# Patient Record
Sex: FEMALE | Race: WHITE | URBAN - METROPOLITAN AREA
[De-identification: names, ages, dates, MRNs, and addresses within clinical notes are randomized per-mention and may not be internally consistent; named-entity substitution may affect disease eponyms.]

---

## 2018-12-16 ENCOUNTER — ED HISTORICAL/CONVERTED ENCOUNTER (OUTPATIENT)
Dept: OTHER | Age: 58
End: 2018-12-16

## 2022-04-14 ENCOUNTER — HOSPITAL ENCOUNTER (EMERGENCY)
Age: 62
Discharge: HOME | End: 2022-04-14
Attending: STUDENT IN AN ORGANIZED HEALTH CARE EDUCATION/TRAINING PROGRAM
Payer: COMMERCIAL

## 2022-04-14 VITALS
TEMPERATURE: 98.4 F | DIASTOLIC BLOOD PRESSURE: 84 MMHG | HEIGHT: 61 IN | RESPIRATION RATE: 18 BRPM | WEIGHT: 127 LBS | SYSTOLIC BLOOD PRESSURE: 147 MMHG | BODY MASS INDEX: 23.98 KG/M2 | HEART RATE: 75 BPM | OXYGEN SATURATION: 98 %

## 2022-04-14 DIAGNOSIS — J01.11 ACUTE RECURRENT FRONTAL SINUSITIS: Primary | ICD-10-CM

## 2022-04-14 PROCEDURE — 99283 EMERGENCY DEPT VISIT LOW MDM: CPT

## 2022-04-14 RX ORDER — VALACYCLOVIR HYDROCHLORIDE 500 MG/1
500 TABLET, FILM COATED ORAL DAILY
COMMUNITY
Start: 2022-03-03

## 2022-04-14 RX ORDER — CETIRIZINE HCL 10 MG
10 TABLET ORAL DAILY
Qty: 7 TABLET | Refills: 0 | Status: SHIPPED | OUTPATIENT
Start: 2022-04-14 | End: 2022-04-21

## 2022-04-14 RX ORDER — VALSARTAN AND HYDROCHLOROTHIAZIDE 80; 12.5 MG/1; MG/1
1 TABLET, FILM COATED ORAL DAILY
COMMUNITY
Start: 2022-03-22

## 2022-04-14 RX ORDER — OXYMETAZOLINE HCL 0.05 %
2 SPRAY, NON-AEROSOL (ML) NASAL 2 TIMES DAILY
Qty: 1 EACH | Refills: 0 | Status: SHIPPED | OUTPATIENT
Start: 2022-04-14 | End: 2022-04-17

## 2022-04-14 RX ORDER — ACETAMINOPHEN 325 MG/1
650 TABLET ORAL
Qty: 20 TABLET | Refills: 0 | Status: SHIPPED | OUTPATIENT
Start: 2022-04-14

## 2022-04-14 RX ORDER — LEVOFLOXACIN 750 MG/1
750 TABLET ORAL DAILY
Qty: 10 TABLET | Refills: 0 | Status: SHIPPED | OUTPATIENT
Start: 2022-04-14 | End: 2022-04-24

## 2022-04-14 RX ORDER — IBUPROFEN 600 MG/1
600 TABLET ORAL
Qty: 20 TABLET | Refills: 0 | Status: SHIPPED | OUTPATIENT
Start: 2022-04-14

## 2022-04-14 NOTE — ED PROVIDER NOTES
[id number]  EMERGENCY DEPARTMENT ENCOUNTER NOTE    Date: 4/14/2022  Patient Name: Ladarius Mendez    History of Presenting Illness     Chief Complaint   Patient presents with    Sinus Infection     HPI: Ladarius Mendez, 62 y.o. female with a past medical history and home medications as listed below presents for URI symptoms. The patient reports a few day history of the following symptoms: Congestion and Sore Throat as well as ear ache and facial pain that is consistent with her recurrent sinusitis that occurs on a yearly basis. The patient does not report any Fever, Chills, Cough, Nausea, Vomiting, Chest Pain and Shortness of breath. Medical History   I reviewed the medical, surgical, family, and social history, as well as allergies:    PCP: None    Past Medical History:  Past Medical History:   Diagnosis Date    Hypertension      Past Surgical History:  History reviewed. No pertinent surgical history. Current Outpatient Medications:  Current Outpatient Medications   Medication Instructions    acetaminophen (TYLENOL) 650 mg, Oral, EVERY 6 HOURS AS NEEDED    cetirizine (ZYRTEC) 10 mg, Oral, DAILY    ibuprofen (MOTRIN) 600 mg, Oral, 3 TIMES DAILY AS NEEDED    levoFLOXacin (LEVAQUIN) 750 mg, Oral, DAILY    oxymetazoline (Afrin, oxymetazoline,) 0.05 % nasal spray 2 Sprays, Both Nostrils, 2 TIMES DAILY, DO NOT TAKE MORE THAN 3-4 DAYS TO PREVENT BAD RUNNY NOSE AS SIDE EFFECT.  valACYclovir (VALTREX) 500 mg, Oral, DAILY    valsartan-hydroCHLOROthiazide (DIOVAN-HCT) 80-12.5 mg per tablet 1 Tablet, Oral, DAILY      Family History:  History reviewed. No pertinent family history. Social History:  Social History     Tobacco Use    Smoking status: Never Smoker    Smokeless tobacco: Never Used   Substance Use Topics    Alcohol use: Yes     Comment: Occ    Drug use: Never     Allergies:   Allergies   Allergen Reactions    Penicillins Unknown (comments)     As a child Review of Systems     All other systems negative. Physical Exam and Vital Signs   Vital Signs - Reviewed the patient's vital signs. Patient Vitals for the past 12 hrs:   Temp Pulse Resp BP SpO2   04/14/22 0802 98.4 °F (36.9 °C) 75 18 (!) 147/84 98 %     Physical Exam:    GENERAL: awake, alert, cooperative, not in distress  HEENT:  * Pupils equal, EOMI  * Head atraumatic  CV:  * audible heart sounds  * warm and perfused extremities bilaterally  PULMONARY: Good air movement, no wheezes, no crackles  ABDOMEN/: soft, no distension, no guarding, no suprapubic tenderness, no abdominal tenderness. : No suprapubic tenderness  EXTREMITIES/BACK: warm and perfused, no tenderness, no edema  SKIN: no rashes or signs of trauma  NEURO:  * GCS 15  * Moves U&LE to command    Medical Decision Making   - I am the first and primary provider for this patient and am the primary provider of record. - I reviewed the vital signs, available nursing notes, past medical history, past surgical history, family history and social history. - Initial assessment performed. The patients presenting problems have been discussed, and the staff are in agreement with the care plan formulated and outlined with them. I have encouraged them to ask questions as they arise throughout their visit. - Available medical records, nursing notes, old EKGs, and EMS run sheets (if patient was EMS transported) were reviewed    MDM:   Patient is a 62 y.o. female presenting for URI symptoms. Vitals reveal no hypoxia, hypotension or tachypnea and physical exam reveals no significant abnormalities. Based on the history, physical exam, risk factors, and vitals signs, differential includes: URI, sinusitis, postnasal drip. Patient is well appearing, not hypoxic, and is not in respiratory distress. Due to normal exam and the absence of hypoxia, there is no concern for significant pneumonia.      Results     Labs:  No results found for this or any previous visit (from the past 12 hour(s)). Radiologic Studies:  CT Results  (Last 48 hours)    None        CXR Results  (Last 48 hours)    None        Medications ordered:  Medications - No data to display  ED Course and Reassessment     ED Course:          Reassessment:      As the patient appears generally well, non-toxic with a completely reassuring clinical picture and exam, and is able to tolerate PO intake, I feel the patient is a good candidate for an outpatient trial of antipyretics and close observation and quarantine with return precautions. Patient is well appearing, not hypoxic, and is not in respiratory distress. Due to normal exam and the absence of hypoxia, there is no concern for significant pneumonia. Workup in the ED not indicated. Will discharge with return precautions. Picture of sinusitis. Will give somatic treatment and levofloxacin. Final Disposition     DISPOSITION: DISCHARGED    Patient will be discharged from the Emergency Department in stable condition. All of the diagnostic tests were reviewed and any questions were answered. Diagnosis, results, follow up if applicable, and return precautions were discussed. I have also put together printed discharge instructions for them that include: 1) educational information regarding their diagnosis, 2) how to care for their diagnosis at home, as well a 3) list of reasons why they would want to return to the ED prior to their follow-up appointment, should their condition change. Any labs or imaging done in the ED will be either printed with the discharge paperwork or available through [address] DISCHARGE PLAN:  1. Current Discharge Medication List      START taking these medications    Details   acetaminophen (TYLENOL) 325 mg tablet Take 2 Tablets by mouth every six (6) hours as needed for Pain. Qty: 20 Tablet, Refills: 0      ibuprofen (MOTRIN) 600 mg tablet Take 1 Tablet by mouth three (3) times daily as needed for Pain.   Qty: 20 Tablet, Refills: 0      cetirizine (ZYRTEC) 10 mg tablet Take 1 Tablet by mouth daily for 7 days. Qty: 7 Tablet, Refills: 0      oxymetazoline (Afrin, oxymetazoline,) 0.05 % nasal spray 2 Sprays by Both Nostrils route two (2) times a day for 3 days. DO NOT TAKE MORE THAN 3-4 DAYS TO PREVENT BAD RUNNY NOSE AS SIDE EFFECT. Qty: 1 Each, Refills: 0      levoFLOXacin (LEVAQUIN) 750 mg tablet Take 1 Tablet by mouth daily for 10 days. Qty: 10 Tablet, Refills: 0         CONTINUE these medications which have NOT CHANGED    Details   valACYclovir (VALTREX) 500 mg tablet Take 500 mg by mouth daily. valsartan-hydroCHLOROthiazide (DIOVAN-HCT) 80-12.5 mg per tablet Take 1 Tablet by mouth daily. 2.   Follow-up Information     Follow up With Specialties Details Why Contact Info    [facility] Emergency Medicine Go to  If symptoms worsen [address]  [phone]    Your doctor  Schedule an appointment as soon as possible for a visit in 3 days          3. Return to ED if worse    4. Current Discharge Medication List      START taking these medications    Details   acetaminophen (TYLENOL) 325 mg tablet Take 2 Tablets by mouth every six (6) hours as needed for Pain. Qty: 20 Tablet, Refills: 0  Start date: 4/14/2022      ibuprofen (MOTRIN) 600 mg tablet Take 1 Tablet by mouth three (3) times daily as needed for Pain. Qty: 20 Tablet, Refills: 0  Start date: 4/14/2022      cetirizine (ZYRTEC) 10 mg tablet Take 1 Tablet by mouth daily for 7 days. Qty: 7 Tablet, Refills: 0  Start date: 4/14/2022, End date: 4/21/2022      oxymetazoline (Afrin, oxymetazoline,) 0.05 % nasal spray 2 Sprays by Both Nostrils route two (2) times a day for 3 days. DO NOT TAKE MORE THAN 3-4 DAYS TO PREVENT BAD RUNNY NOSE AS SIDE EFFECT.   Qty: 1 Each, Refills: 0  Start date: 4/14/2022, End date: 4/17/2022      levoFLOXacin (LEVAQUIN) 750 mg tablet Take 1 Tablet by mouth daily for 10 days.  Duanne Felty: 10 Tablet, Refills: 0  Start date: 4/14/2022, End date: 4/24/2022             Diagnosis     Clinical Impression:   1. Acute recurrent frontal sinusitis      Attestations:    Samantha Tapia MD    Please note that this dictation was completed with [employer], the computer voice recognition software. Quite often unanticipated grammatical, syntax, homophones, and other interpretive errors are inadvertently transcribed by the computer software. Please disregard these errors. Please excuse any errors that have escaped final proofreading. Thank you.

## 2022-12-24 ENCOUNTER — APPOINTMENT (OUTPATIENT)
Dept: GENERAL RADIOLOGY | Age: 62
End: 2022-12-24
Attending: STUDENT IN AN ORGANIZED HEALTH CARE EDUCATION/TRAINING PROGRAM
Payer: COMMERCIAL

## 2022-12-24 ENCOUNTER — HOSPITAL ENCOUNTER (EMERGENCY)
Age: 62
Discharge: HOME | End: 2022-12-24
Attending: STUDENT IN AN ORGANIZED HEALTH CARE EDUCATION/TRAINING PROGRAM
Payer: COMMERCIAL

## 2022-12-24 VITALS
OXYGEN SATURATION: 98 % | HEIGHT: 61 IN | WEIGHT: 127 LBS | DIASTOLIC BLOOD PRESSURE: 76 MMHG | HEART RATE: 83 BPM | SYSTOLIC BLOOD PRESSURE: 125 MMHG | RESPIRATION RATE: 18 BRPM | TEMPERATURE: 98.4 F | BODY MASS INDEX: 23.98 KG/M2

## 2022-12-24 DIAGNOSIS — J18.9 PNEUMONIA OF BOTH LUNGS DUE TO INFECTIOUS ORGANISM, UNSPECIFIED PART OF LUNG: Primary | ICD-10-CM

## 2022-12-24 LAB
ALBUMIN SERPL-MCNC: 3.6 G/DL (ref 3.5–5)
ALBUMIN/GLOB SERPL: 1 {RATIO} (ref 1.1–2.2)
ALP SERPL-CCNC: 53 U/L (ref 45–117)
ALT SERPL-CCNC: 36 U/L (ref 12–78)
ANION GAP SERPL CALC-SCNC: 10 MMOL/L (ref 5–15)
AST SERPL W P-5'-P-CCNC: 37 U/L (ref 15–37)
BASOPHILS # BLD: 0 K/UL (ref 0–0.1)
BASOPHILS NFR BLD: 0 % (ref 0–1)
BILIRUB SERPL-MCNC: 0.5 MG/DL (ref 0.2–1)
BUN SERPL-MCNC: 11 MG/DL (ref 6–20)
BUN/CREAT SERPL: 13 (ref 12–20)
CA-I BLD-MCNC: 9.1 MG/DL (ref 8.5–10.1)
CHLORIDE SERPL-SCNC: 101 MMOL/L (ref 97–108)
CO2 SERPL-SCNC: 27 MMOL/L (ref 21–32)
CREAT SERPL-MCNC: 0.82 MG/DL (ref 0.55–1.02)
DIFFERENTIAL METHOD BLD: ABNORMAL
EOSINOPHIL # BLD: 0 K/UL (ref 0–0.4)
EOSINOPHIL NFR BLD: 0 % (ref 0–7)
ERYTHROCYTE [DISTWIDTH] IN BLOOD BY AUTOMATED COUNT: 12.2 % (ref 11.5–14.5)
GLOBULIN SER CALC-MCNC: 3.6 G/DL (ref 2–4)
GLUCOSE SERPL-MCNC: 106 MG/DL (ref 65–100)
HCT VFR BLD AUTO: 39.2 % (ref 35–47)
HGB BLD-MCNC: 13.1 G/DL (ref 11.5–16)
IMM GRANULOCYTES # BLD AUTO: 0 K/UL
IMM GRANULOCYTES NFR BLD AUTO: 0 %
LYMPHOCYTES # BLD: 0.9 K/UL (ref 0.8–3.5)
LYMPHOCYTES NFR BLD: 8 % (ref 12–49)
MCH RBC QN AUTO: 33.1 PG (ref 26–34)
MCHC RBC AUTO-ENTMCNC: 33.4 G/DL (ref 30–36.5)
MCV RBC AUTO: 99 FL (ref 80–99)
MONOCYTES # BLD: 0.6 K/UL (ref 0–1)
MONOCYTES NFR BLD: 6 % (ref 5–13)
NEUTS BAND NFR BLD MANUAL: 30 % (ref 0–6)
NEUTS SEG # BLD: 9.3 K/UL (ref 1.8–8)
NEUTS SEG NFR BLD: 56 % (ref 32–75)
PLATELET # BLD AUTO: 151 K/UL (ref 150–400)
PMV BLD AUTO: 10.1 FL (ref 8.9–12.9)
POTASSIUM SERPL-SCNC: 3.5 MMOL/L (ref 3.5–5.1)
PROT SERPL-MCNC: 7.2 G/DL (ref 6.4–8.2)
RBC # BLD AUTO: 3.96 M/UL (ref 3.8–5.2)
RBC MORPH BLD: ABNORMAL
SODIUM SERPL-SCNC: 138 MMOL/L (ref 136–145)
WBC # BLD AUTO: 10.8 K/UL (ref 3.6–11)

## 2022-12-24 PROCEDURE — 74011250637 HC RX REV CODE- 250/637: Performed by: STUDENT IN AN ORGANIZED HEALTH CARE EDUCATION/TRAINING PROGRAM

## 2022-12-24 PROCEDURE — 74011250636 HC RX REV CODE- 250/636: Performed by: STUDENT IN AN ORGANIZED HEALTH CARE EDUCATION/TRAINING PROGRAM

## 2022-12-24 PROCEDURE — 36415 COLL VENOUS BLD VENIPUNCTURE: CPT

## 2022-12-24 PROCEDURE — 80053 COMPREHEN METABOLIC PANEL: CPT

## 2022-12-24 PROCEDURE — 99284 EMERGENCY DEPT VISIT MOD MDM: CPT

## 2022-12-24 PROCEDURE — 74011000250 HC RX REV CODE- 250: Performed by: STUDENT IN AN ORGANIZED HEALTH CARE EDUCATION/TRAINING PROGRAM

## 2022-12-24 PROCEDURE — 85025 COMPLETE CBC W/AUTO DIFF WBC: CPT

## 2022-12-24 PROCEDURE — 71046 X-RAY EXAM CHEST 2 VIEWS: CPT

## 2022-12-24 RX ORDER — LEVOFLOXACIN 750 MG/1
750 TABLET ORAL DAILY
Qty: 10 TABLET | Refills: 0 | Status: SHIPPED | OUTPATIENT
Start: 2022-12-24

## 2022-12-24 RX ORDER — LIDOCAINE HYDROCHLORIDE 20 MG/ML
10 SOLUTION OROPHARYNGEAL
Status: COMPLETED | OUTPATIENT
Start: 2022-12-24 | End: 2022-12-24

## 2022-12-24 RX ORDER — LIDOCAINE HYDROCHLORIDE 20 MG/ML
15 SOLUTION OROPHARYNGEAL AS NEEDED
Qty: 100 ML | Refills: 0 | Status: SHIPPED | OUTPATIENT
Start: 2022-12-24

## 2022-12-24 RX ORDER — DOXYCYCLINE 100 MG/1
100 CAPSULE ORAL
Status: DISCONTINUED | OUTPATIENT
Start: 2022-12-24 | End: 2022-12-24

## 2022-12-24 RX ORDER — LEVOFLOXACIN 250 MG/1
500 TABLET ORAL
Status: COMPLETED | OUTPATIENT
Start: 2022-12-24 | End: 2022-12-24

## 2022-12-24 RX ADMIN — Medication 10 ML: at 13:05

## 2022-12-24 RX ADMIN — LEVOFLOXACIN 500 MG: 250 TABLET, FILM COATED ORAL at 12:37

## 2022-12-24 RX ADMIN — SODIUM CHLORIDE 1000 ML: 9 INJECTION, SOLUTION INTRAVENOUS at 12:36

## 2022-12-24 NOTE — ED TRIAGE NOTES
Patient reports 3 days of fever, sore throat, home test for covid positive, bilateral ear pain, productive cough, and headache.

## 2022-12-24 NOTE — ED PROVIDER NOTES
EMERGENCY DEPARTMENT HISTORY AND PHYSICAL EXAM      Date: 12/24/2022  Patient Name: Oleksandr Irby    History of Presenting Illness     Chief Complaint   Patient presents with    Positive For Covid-19       History Provided By: Patient    HPI: Oleksandr Irby, 65 y.o. female with a past medical history significant hypertension presents to the ED with cc of cough, productive of thick sputum. Patient states she was recently diagnosed with COVID-19, is currently on antivirals. States that over the last 2 days has noticed worsening cough productive of greenish sputum, denies any chest pain denies any shortness of breath. There are no other complaints, changes, or physical findings at this time. PCP: Kris Carias MD    No current facility-administered medications on file prior to encounter. Current Outpatient Medications on File Prior to Encounter   Medication Sig Dispense Refill    valACYclovir (VALTREX) 500 mg tablet Take 500 mg by mouth daily. valsartan-hydroCHLOROthiazide (DIOVAN-HCT) 80-12.5 mg per tablet Take 1 Tablet by mouth daily. acetaminophen (TYLENOL) 325 mg tablet Take 2 Tablets by mouth every six (6) hours as needed for Pain. 20 Tablet 0    ibuprofen (MOTRIN) 600 mg tablet Take 1 Tablet by mouth three (3) times daily as needed for Pain. 20 Tablet 0       Past History     Past Medical History:  Past Medical History:   Diagnosis Date    Hypertension        Past Surgical History:  No past surgical history on file. Family History:  No family history on file. Social History:  Social History     Tobacco Use    Smoking status: Never    Smokeless tobacco: Never   Substance Use Topics    Alcohol use: Yes     Comment: Occ    Drug use: Never       Allergies: Allergies   Allergen Reactions    Penicillins Unknown (comments)     As a child         Review of Systems     Review of Systems   Constitutional:  Positive for chills. Negative for activity change, appetite change and fever.    HENT: Positive for sore throat. Negative for ear pain and rhinorrhea. Eyes:  Negative for photophobia and visual disturbance. Respiratory:  Positive for cough and shortness of breath. Cardiovascular:  Negative for chest pain and palpitations. Gastrointestinal:  Negative for abdominal pain and nausea. Genitourinary:  Negative for dysuria and hematuria. Musculoskeletal:  Negative for arthralgias and myalgias. Skin:  Negative for color change and pallor. Neurological:  Negative for dizziness, weakness, numbness and headaches. Physical Exam     Physical Exam  Vitals and nursing note reviewed. Constitutional:       General: She is not in acute distress. Appearance: Normal appearance. She is normal weight. She is not ill-appearing. HENT:      Head: Normocephalic and atraumatic. Nose: Nose normal.      Mouth/Throat:      Mouth: Mucous membranes are moist.   Eyes:      Extraocular Movements: Extraocular movements intact. Pupils: Pupils are equal, round, and reactive to light. Cardiovascular:      Rate and Rhythm: Normal rate and regular rhythm. Pulses: Normal pulses. Heart sounds: Normal heart sounds. Pulmonary:      Effort: Pulmonary effort is normal.      Breath sounds: Normal breath sounds. Abdominal:      General: Abdomen is flat. Bowel sounds are normal.      Palpations: Abdomen is soft. Tenderness: There is no abdominal tenderness. There is no guarding. Musculoskeletal:         General: No tenderness. Normal range of motion. Cervical back: Normal range of motion and neck supple. No muscular tenderness. Skin:     General: Skin is warm and dry. Neurological:      General: No focal deficit present. Mental Status: She is alert and oriented to person, place, and time. Sensory: No sensory deficit. Motor: No weakness.        Diagnostic Study Results     Labs -     Recent Results (from the past 12 hour(s))   CBC WITH AUTOMATED DIFF    Collection Time: 12/24/22 12:35 PM   Result Value Ref Range    WBC 10.8 3.6 - 11.0 K/uL    RBC 3.96 3.80 - 5.20 M/uL    HGB 13.1 11.5 - 16.0 g/dL    HCT 39.2 35.0 - 47.0 %    MCV 99.0 80.0 - 99.0 FL    MCH 33.1 26.0 - 34.0 PG    MCHC 33.4 30.0 - 36.5 g/dL    RDW 12.2 11.5 - 14.5 %    PLATELET 030 084 - 771 K/uL    MPV 10.1 8.9 - 12.9 FL    NEUTROPHILS 56 32 - 75 %    BAND NEUTROPHILS 30 (H) 0 - 6 %    LYMPHOCYTES 8 (L) 12 - 49 %    MONOCYTES 6 5 - 13 %    EOSINOPHILS 0 0 - 7 %    BASOPHILS 0 0 - 1 %    IMMATURE GRANULOCYTES 0 %    ABS. NEUTROPHILS 9.3 (H) 1.8 - 8.0 K/UL    ABS. LYMPHOCYTES 0.9 0.8 - 3.5 K/UL    ABS. MONOCYTES 0.6 0.0 - 1.0 K/UL    ABS. EOSINOPHILS 0.0 0.0 - 0.4 K/UL    ABS. BASOPHILS 0.0 0.0 - 0.1 K/UL    ABS. IMM. GRANS. 0.0 K/UL    DF Manual      RBC COMMENTS Anisocytosis  2+       METABOLIC PANEL, COMPREHENSIVE    Collection Time: 12/24/22 12:35 PM   Result Value Ref Range    Sodium 138 136 - 145 mmol/L    Potassium 3.5 3.5 - 5.1 mmol/L    Chloride 101 97 - 108 mmol/L    CO2 27 21 - 32 mmol/L    Anion gap 10 5 - 15 mmol/L    Glucose 106 (H) 65 - 100 mg/dL    BUN 11 6 - 20 mg/dL    Creatinine 0.82 0.55 - 1.02 mg/dL    BUN/Creatinine ratio 13 12 - 20      eGFR >60 >60 ml/min/1.73m2    Calcium 9.1 8.5 - 10.1 mg/dL    Bilirubin, total 0.5 0.2 - 1.0 mg/dL    AST (SGOT) 37 15 - 37 U/L    ALT (SGPT) 36 12 - 78 U/L    Alk. phosphatase 53 45 - 117 U/L    Protein, total 7.2 6.4 - 8.2 g/dL    Albumin 3.6 3.5 - 5.0 g/dL    Globulin 3.6 2.0 - 4.0 g/dL    A-G Ratio 1.0 (L) 1.1 - 2.2         Radiologic Studies -   @lastxrresult@  CT Results  (Last 48 hours)      None          CXR Results  (Last 48 hours)                 12/24/22 1209  XR CHEST PA LAT Final result    Impression:  Small RIGHT upper lobe infiltrate consistent with infection. Hazy RIGHT lower   lobe airspace disease may represent multifocal infection. Narrative:  EXAM: XR CHEST PA LAT       INDICATION: COVID + , productive cough. COMPARISON: Chest radiographs October 6, 2008. FINDINGS: PA and lateral radiographs of the chest. The heart size is normal. The   lungs are well-expanded. There is a small focal lung infiltrate in the RIGHT   upper lobe. Hazy airspace opacity is seen at the RIGHT lower lobe. There is no   evidence of effusion or pneumothorax. The vascular clarity is normal. No   displaced fracture is seen. There are clips from prior cholecystectomy. Medical Decision Making   I am the first provider for this patient. I reviewed the vital signs, available nursing notes, past medical history, past surgical history, family history and social history. Vital Signs-Reviewed the patient's vital signs. Patient Vitals for the past 12 hrs:   Temp Pulse Resp BP SpO2   12/24/22 1149 98.4 °F (36.9 °C) 83 18 125/76 98 %       Records Reviewed: Nursing Notes    The patient presents with cough, shortness of breath with a differential diagnosis of pneumonia, COVID-19, bronchitis, influenza, pleural effusion      Provider Notes (Medical Decision Making):     MDM     59-year-old no significant past medical history recently treated for COVID-19 using antivirals, presents to emergency department for cough shortness of breath productive of greenish sputum over the last 3 days. Physical shows well-appearing female no distress afebrile saturating 98%. Chest x-ray ordered. ED Course:   Initial assessment performed. The patients presenting problems have been discussed, and they are in agreement with the care plan formulated and outlined with them. I have encouraged them to ask questions as they arise throughout their visit. ED Course as of 12/24/22 1345   Sat Dec 24, 2022   1246 Patient's chest x-ray reviewed by myself and by radiology patient has right middle lobe infiltrate, left lower lobe infiltrate. Patient's oxygenation 98%, not hypotensive. Will draw basic lab work.    [PZ]   1259 BUN 11, patient not hypoxic, using US ACS pneumonia clinical decision tool determined that patient is candidate for outpatient treatment, 1 dose of p.o. Levaquin given in ED, will discharge patient with 10 days prescription of Levaquin. [PZ]   1303 WBC: 10.8 [PZ]      ED Course User Index  [PZ] Carlyn Jones MD         PROCEDURES  Procedures         PLAN:  1. Current Discharge Medication List        START taking these medications    Details   levoFLOXacin (Levaquin) 750 mg tablet Take 1 Tablet by mouth daily. Qty: 10 Tablet, Refills: 0  Start date: 12/24/2022      lidocaine (XYLOCAINE) 2 % solution Take 15 mL by mouth as needed for Pain. Qty: 100 mL, Refills: 0  Start date: 12/24/2022           2. Follow-up Information       Follow up With Specialties Details Why Contact Info    Antione Castillo MD Family Medicine In 1 week As needed [address] [facility]  [phone]            Return to ED if worse     Diagnosis     Clinical Impression:   1.  Pneumonia of both lungs due to infectious organism, unspecified part of lung

## 2023-10-24 ENCOUNTER — APPOINTMENT (OUTPATIENT)
Facility: HOSPITAL | Age: 63
End: 2023-10-24
Payer: COMMERCIAL

## 2023-10-24 ENCOUNTER — HOSPITAL ENCOUNTER (EMERGENCY)
Facility: HOSPITAL | Age: 63
Discharge: HOME | End: 2023-10-24
Attending: FAMILY MEDICINE
Payer: COMMERCIAL

## 2023-10-24 VITALS
HEIGHT: 61 IN | WEIGHT: 140 LBS | RESPIRATION RATE: 19 BRPM | BODY MASS INDEX: 26.43 KG/M2 | OXYGEN SATURATION: 98 % | TEMPERATURE: 98.4 F | SYSTOLIC BLOOD PRESSURE: 121 MMHG | DIASTOLIC BLOOD PRESSURE: 67 MMHG | HEART RATE: 78 BPM

## 2023-10-24 DIAGNOSIS — K57.32 SIGMOID DIVERTICULITIS: Primary | ICD-10-CM

## 2023-10-24 LAB
ALBUMIN SERPL-MCNC: 3.3 G/DL (ref 3.5–5)
ALBUMIN/GLOB SERPL: 0.8 (ref 1.1–2.2)
ALP SERPL-CCNC: 58 U/L (ref 45–117)
ALT SERPL-CCNC: 17 U/L (ref 12–78)
ANION GAP SERPL CALC-SCNC: 8 MMOL/L (ref 5–15)
APPEARANCE UR: ABNORMAL
AST SERPL W P-5'-P-CCNC: 22 U/L (ref 15–37)
BACTERIA URNS QL MICRO: ABNORMAL /HPF
BASOPHILS # BLD: 0 K/UL (ref 0–0.1)
BASOPHILS NFR BLD: 0 % (ref 0–1)
BILIRUB SERPL-MCNC: 1.1 MG/DL (ref 0.2–1)
BILIRUB UR QL CFM: NEGATIVE
BILIRUB UR QL: ABNORMAL
BUN SERPL-MCNC: 13 MG/DL (ref 6–20)
BUN/CREAT SERPL: 19 (ref 12–20)
CA-I BLD-MCNC: 9.3 MG/DL (ref 8.5–10.1)
CHLORIDE SERPL-SCNC: 98 MMOL/L (ref 97–108)
CO2 SERPL-SCNC: 27 MMOL/L (ref 21–32)
COLOR UR: YELLOW
CREAT SERPL-MCNC: 0.69 MG/DL (ref 0.55–1.02)
DIFFERENTIAL METHOD BLD: ABNORMAL
EOSINOPHIL # BLD: 0 K/UL (ref 0–0.4)
EOSINOPHIL NFR BLD: 0 % (ref 0–7)
EPITH CASTS URNS QL MICRO: ABNORMAL /LPF
ERYTHROCYTE [DISTWIDTH] IN BLOOD BY AUTOMATED COUNT: 12 % (ref 11.5–14.5)
GLOBULIN SER CALC-MCNC: 4.4 G/DL (ref 2–4)
GLUCOSE SERPL-MCNC: 122 MG/DL (ref 65–100)
GLUCOSE UR STRIP.AUTO-MCNC: NEGATIVE MG/DL
HCT VFR BLD AUTO: 35.8 % (ref 35–47)
HGB BLD-MCNC: 12 G/DL (ref 11.5–16)
HGB UR QL STRIP: ABNORMAL
IMM GRANULOCYTES # BLD AUTO: 0 K/UL (ref 0–0.04)
IMM GRANULOCYTES NFR BLD AUTO: 0 % (ref 0–0.5)
KETONES UR QL STRIP.AUTO: 15 MG/DL
LEUKOCYTE ESTERASE UR QL STRIP.AUTO: NEGATIVE
LIPASE SERPL-CCNC: 28 U/L (ref 13–75)
LYMPHOCYTES # BLD: 1.6 K/UL (ref 0.8–3.5)
LYMPHOCYTES NFR BLD: 13 % (ref 12–49)
MCH RBC QN AUTO: 33.2 PG (ref 26–34)
MCHC RBC AUTO-ENTMCNC: 33.5 G/DL (ref 30–36.5)
MCV RBC AUTO: 99.2 FL (ref 80–99)
MONOCYTES # BLD: 1.1 K/UL (ref 0–1)
MONOCYTES NFR BLD: 9 % (ref 5–13)
MUCOUS THREADS URNS QL MICRO: ABNORMAL /LPF
NEUTS SEG # BLD: 9.2 K/UL (ref 1.8–8)
NEUTS SEG NFR BLD: 78 % (ref 32–75)
NITRITE UR QL STRIP.AUTO: NEGATIVE
PH UR STRIP: 5 (ref 5–8)
PLATELET # BLD AUTO: 280 K/UL (ref 150–400)
PMV BLD AUTO: 10 FL (ref 8.9–12.9)
POTASSIUM SERPL-SCNC: 3.7 MMOL/L (ref 3.5–5.1)
PROT SERPL-MCNC: 7.7 G/DL (ref 6.4–8.2)
PROT UR STRIP-MCNC: NEGATIVE MG/DL
RBC # BLD AUTO: 3.61 M/UL (ref 3.8–5.2)
RBC #/AREA URNS HPF: ABNORMAL /HPF (ref 0–5)
SODIUM SERPL-SCNC: 133 MMOL/L (ref 136–145)
SP GR UR REFRACTOMETRY: 1.02 (ref 1–1.03)
URINE CULTURE IF INDICATED: ABNORMAL
UROBILINOGEN UR QL STRIP.AUTO: 4 EU/DL (ref 0.2–1)
WBC # BLD AUTO: 11.9 K/UL (ref 3.6–11)
WBC URNS QL MICRO: ABNORMAL /HPF (ref 0–4)

## 2023-10-24 PROCEDURE — 85025 COMPLETE CBC W/AUTO DIFF WBC: CPT

## 2023-10-24 PROCEDURE — 99285 EMERGENCY DEPT VISIT HI MDM: CPT

## 2023-10-24 PROCEDURE — 6360000004 HC RX CONTRAST MEDICATION: Performed by: FAMILY MEDICINE

## 2023-10-24 PROCEDURE — 6370000000 HC RX 637 (ALT 250 FOR IP): Performed by: FAMILY MEDICINE

## 2023-10-24 PROCEDURE — 83690 ASSAY OF LIPASE: CPT

## 2023-10-24 PROCEDURE — 81001 URINALYSIS AUTO W/SCOPE: CPT

## 2023-10-24 PROCEDURE — 80053 COMPREHEN METABOLIC PANEL: CPT

## 2023-10-24 PROCEDURE — 74177 CT ABD & PELVIS W/CONTRAST: CPT

## 2023-10-24 RX ORDER — CIPROFLOXACIN 500 MG/1
500 TABLET, FILM COATED ORAL 2 TIMES DAILY
Qty: 20 TABLET | Refills: 0 | Status: SHIPPED | OUTPATIENT
Start: 2023-10-24 | End: 2023-10-24 | Stop reason: SDUPTHER

## 2023-10-24 RX ORDER — CIPROFLOXACIN 500 MG/1
500 TABLET, FILM COATED ORAL 2 TIMES DAILY
Qty: 14 TABLET | Refills: 0 | Status: SHIPPED | OUTPATIENT
Start: 2023-10-24 | End: 2023-10-31

## 2023-10-24 RX ORDER — CIPROFLOXACIN 500 MG/1
500 TABLET, FILM COATED ORAL
Status: COMPLETED | OUTPATIENT
Start: 2023-10-24 | End: 2023-10-24

## 2023-10-24 RX ORDER — METRONIDAZOLE 500 MG/1
500 TABLET ORAL 3 TIMES DAILY
Qty: 21 TABLET | Refills: 0 | Status: SHIPPED | OUTPATIENT
Start: 2023-10-24 | End: 2023-10-31

## 2023-10-24 RX ORDER — METRONIDAZOLE 500 MG/1
500 TABLET ORAL
Status: COMPLETED | OUTPATIENT
Start: 2023-10-24 | End: 2023-10-24

## 2023-10-24 RX ADMIN — METRONIDAZOLE 500 MG: 500 TABLET ORAL at 17:37

## 2023-10-24 RX ADMIN — IOPAMIDOL 100 ML: 755 INJECTION, SOLUTION INTRAVENOUS at 16:43

## 2023-10-24 RX ADMIN — CIPROFLOXACIN 500 MG: 500 TABLET, FILM COATED ORAL at 17:37

## 2023-10-24 ASSESSMENT — PAIN DESCRIPTION - LOCATION: LOCATION: PELVIS

## 2023-10-24 ASSESSMENT — PAIN - FUNCTIONAL ASSESSMENT: PAIN_FUNCTIONAL_ASSESSMENT: 0-10

## 2023-10-24 ASSESSMENT — PAIN SCALES - GENERAL: PAINLEVEL_OUTOF10: 8

## 2023-10-24 NOTE — DISCHARGE INSTRUCTIONS
Thank you! Thank you for allowing me to care for you in the emergency department. It is my goal to provide you with excellent care. If you have not received excellent quality care, please ask to speak to the nurse manager. Please fill out the survey that will come to you by mail or email since we listen to your feedback! Below you will find a list of your tests from today's visit. Should you have any questions, please do not hesitate to call the emergency department.     Labs  Recent Results (from the past 12 hour(s))   Urinalysis with Reflex to Culture    Collection Time: 10/24/23  2:43 PM    Specimen: Urine   Result Value Ref Range    Color, UA Yellow      Appearance Hazy (A) Clear      Specific Gravity, UA 1.020 1.003 - 1.030      pH, Urine 5.0 5.0 - 8.0      Protein, UA Negative Negative mg/dL    Glucose, UA Negative Negative mg/dL    Ketones, Urine 15 (A) Negative mg/dL    Bilirubin Urine Small (A) Negative      Blood, Urine Small (A) Negative      Urobilinogen, Urine 4.0 (H) 0.2 - 1.0 EU/dL    Nitrite, Urine Negative Negative      Leukocyte Esterase, Urine Negative Negative      Bilirubin Confirmation, UA Negative Negative      WBC, UA 5-10 0 - 4 /hpf    RBC, UA 0-5 0 - 5 /hpf    Epithelial Cells UA Many (A) Few /lpf    BACTERIA, URINE 1+ (A) Negative /hpf    Urine Culture if Indicated Culture not indicated by UA result Culture not indicated by UA result      Mucus, UA Trace (A) Negative /lpf   CBC with Auto Differential    Collection Time: 10/24/23  3:25 PM   Result Value Ref Range    WBC 11.9 (H) 3.6 - 11.0 K/uL    RBC 3.61 (L) 3.80 - 5.20 M/uL    Hemoglobin 12.0 11.5 - 16.0 g/dL    Hematocrit 35.8 35.0 - 47.0 %    MCV 99.2 (H) 80.0 - 99.0 FL    MCH 33.2 26.0 - 34.0 PG    MCHC 33.5 30.0 - 36.5 g/dL    RDW 12.0 11.5 - 14.5 %    Platelets 848 054 - 592 K/uL    MPV 10.0 8.9 - 12.9 FL    Neutrophils % 78 (H) 32 - 75 %    Lymphocytes % 13 12 - 49 %    Monocytes % 9 5 - 13 %    Eosinophils % 0 0 - 7 % you go to your follow-up appointment. If you have any problem arranging a follow-up appointment, contact the Emergency Department. If your symptoms become worse or you do not improve as expected and you are unable to reach your health care provider, please return to the Emergency Department. We are available 24 hours a day. If a prescription has been provided, please have it filled as soon as possible to prevent a delay in treatment. If you have any questions or reservations about taking the medication due to side effects or interactions with other medications, please call your primary care provider or contact the ER.

## 2023-10-24 NOTE — ED TRIAGE NOTES
Pt reports lower abdominal pain, pelvic pain x3 days. Pt reports onset of dysuria that started 1 day ago.

## 2023-10-29 NOTE — ED PROVIDER NOTES
EMERGENCY DEPARTMENT HISTORY AND PHYSICAL EXAM      Date: 10/24/2023  Patient Name: Deisy Gomez    History of Presenting Illness         History Provided By:     [facility]: Deisy Gomez is a very pleasant 59 y.o. female presenting to the ED with a chief complaint of left lower quadrant pain. She has not states pelvic pain however she describes more left lower quadrant pain that is achy. Triage note also states dysuria however she denies any urinary complaints at this time. No dysuria, hematuria nor frequency. No flank pain. No nausea vomiting or diarrhea. No fevers chills or rigors. No bloody or black stools. No alleviating or aggravating factors. Denies any other symptoms at this time. PCP: Corry Key MD    No current facility-administered medications on file prior to encounter. Current Outpatient Medications on File Prior to Encounter   Medication Sig Dispense Refill    acetaminophen (TYLENOL) 325 MG tablet Take 2 tablets by mouth every 6 hours as needed      ibuprofen (ADVIL;MOTRIN) 600 MG tablet Take 1 tablet by mouth 3 times daily as needed      lidocaine viscous hcl (XYLOCAINE) 2 % SOLN solution Take 15 mLs by mouth as needed      valACYclovir (VALTREX) 500 MG tablet Take 1 tablet by mouth daily      valsartan-hydroCHLOROthiazide (DIOVAN-HCT) 80-12.5 MG per tablet Take 1 tablet by mouth daily         Past History     Past Medical History:  Past Medical History:   Diagnosis Date    Hypertension        Past Surgical History:  No past surgical history on file. Family History:  No family history on file. Social History:  Social History     Tobacco Use    Smoking status: Never    Smokeless tobacco: Never   Substance Use Topics    Alcohol use: Yes    Drug use: Never       Allergies:   Allergies   Allergen Reactions    Penicillins      Other reaction(s): Unknown (comments)  As a child         Review of Systems     Negative unless otherwise stated in HPI    Physical Exam

## 2023-11-07 ENCOUNTER — TELEPHONE (OUTPATIENT)
Age: 63
End: 2023-11-07

## 2023-11-07 NOTE — TELEPHONE ENCOUNTER
Patient has seen Dr. Akshat Choudhary before but because she had a total Hysterectomy and he did it around 2008 or 2009. Patient has not been seen since that time frame and she would be considered a New Patient again. Was recently seen in the free standing ER for Kalen allen which they gave her 2 rounds of antibiotics. Woke up on Sunday after just finishing her antibiotics to Blood small amounts, Discharge with no smell and a bit of abdominal discomfort but thinks it can be from her diagnosis.  Would like to be seen but the first available appointment will be in December for a New Patient

## 2023-11-15 ENCOUNTER — OFFICE VISIT (OUTPATIENT)
Age: 63
End: 2023-11-15
Payer: COMMERCIAL

## 2023-11-15 VITALS
HEART RATE: 82 BPM | RESPIRATION RATE: 16 BRPM | BODY MASS INDEX: 25.75 KG/M2 | HEIGHT: 61 IN | WEIGHT: 136.38 LBS | OXYGEN SATURATION: 100 % | TEMPERATURE: 97.5 F | DIASTOLIC BLOOD PRESSURE: 77 MMHG | SYSTOLIC BLOOD PRESSURE: 149 MMHG

## 2023-11-15 DIAGNOSIS — N89.8 VAGINAL DISCHARGE: Primary | ICD-10-CM

## 2023-11-15 PROCEDURE — 99204 OFFICE O/P NEW MOD 45 MIN: CPT | Performed by: OBSTETRICS & GYNECOLOGY

## 2023-11-15 RX ORDER — FLUCONAZOLE 150 MG/1
150 TABLET ORAL
Qty: 2 TABLET | Refills: 0 | Status: SHIPPED | OUTPATIENT
Start: 2023-11-15 | End: 2023-11-21

## 2023-11-15 ASSESSMENT — ENCOUNTER SYMPTOMS
RESPIRATORY NEGATIVE: 1
GASTROINTESTINAL NEGATIVE: 1

## 2023-11-15 NOTE — PROGRESS NOTES
Bhargavi Schafer is a [id number], 59 y.o. female   No LMP recorded. Patient has had a hysterectomy. She presents for her problem    She is having  vaginal discharge  . Started after taking 2 abxs for diverticulitis      Menstrual status:  Cycles are hysterectomy. Flow: absent. She does not have dysmenorrhea. Medical conditions:  Since her last annual GYN exam about one year ago, she has not the following changes in her health history: none. Mammogram History:    RUTH Results (most recent):  @[facility]([id number]:1)@     DEXA Results (most recent):  @[facility]([id number]:1)@       Past Medical History:   Diagnosis Date    Diverticulitis     Hypertension     Skin cancer      Past Surgical History:   Procedure Laterality Date    CHOLECYSTECTOMY      HYSTERECTOMY (CERVIX STATUS UNKNOWN)      NECK SURGERY      fusion    TONSILLECTOMY         Prior to Admission medications    Medication Sig Start Date End Date Taking?  Authorizing Provider   Clindamycin Phosphate, 1 Dose, 2 % CREA Insert 1 applicator vaginally once 11/15/23  Yes Kylee Arce MD   fluconazole (DIFLUCAN) 150 MG tablet Take 1 tablet by mouth every 72 hours for 6 days 11/15/23 11/21/23 Yes Kylee Arce MD   valACYclovir (VALTREX) 500 MG tablet Take 1 tablet by mouth daily 3/3/22  Yes Automatic Reconciliation, Ar   valsartan-hydroCHLOROthiazide (DIOVAN-HCT) 80-12.5 MG per tablet Take 1 tablet by mouth daily 3/22/22  Yes Automatic Reconciliation, Ar   acetaminophen (TYLENOL) 325 MG tablet Take 2 tablets by mouth every 6 hours as needed  Patient not taking: Reported on 11/15/2023 4/14/22   Automatic Reconciliation, Ar   ibuprofen (ADVIL;MOTRIN) 600 MG tablet Take 1 tablet by mouth 3 times daily as needed  Patient not taking: Reported on 11/15/2023 4/14/22   Automatic Reconciliation, Ar   lidocaine viscous hcl (XYLOCAINE) 2 % SOLN solution Take 15 mLs by mouth as needed  Patient not taking: Reported on 11/15/2023 12/24/22   Automatic

## 2023-11-21 LAB
A VAGINAE DNA VAG QL NAA+PROBE: ABNORMAL SCORE
BVAB2 DNA VAG QL NAA+PROBE: ABNORMAL SCORE
C ALBICANS DNA VAG QL NAA+PROBE: NEGATIVE
C GLABRATA DNA VAG QL NAA+PROBE: POSITIVE
C KRUSEI DNA VAG QL NAA+PROBE: NEGATIVE
C LUSITANIAE DNA VAG QL NAA+PROBE: NEGATIVE
C TRACH DNA VAG QL NAA+PROBE: NEGATIVE
CANDIDA DNA VAG QL NAA+PROBE: NEGATIVE
MEGA1 DNA VAG QL NAA+PROBE: ABNORMAL SCORE
N GONORRHOEA DNA VAG QL NAA+PROBE: NEGATIVE
T VAGINALIS DNA VAG QL NAA+PROBE: NEGATIVE

## 2023-11-21 NOTE — PROGRESS NOTES
Pt states she has f/up CT tomorrow- per pt discharge has changed and has an odor to it- like if she had a loose stool, denies any F/C or abdominal pain    Will see what study shows tomorrow and go from there- P referral to Dr. Lakisha Ayoub if CT c/w fistula

## 2023-12-01 ENCOUNTER — TELEPHONE (OUTPATIENT)
Age: 63
End: 2023-12-01

## 2023-12-01 NOTE — TELEPHONE ENCOUNTER
Called to check on pt and go over the recent f/up CT that was performed(ordered by pt's PCP).  NA, LVM

## 2025-04-08 ENCOUNTER — HOSPITAL ENCOUNTER (EMERGENCY)
Facility: HOSPITAL | Age: 65
Discharge: HOME | End: 2025-04-08
Attending: EMERGENCY MEDICINE
Payer: COMMERCIAL

## 2025-04-08 ENCOUNTER — APPOINTMENT (OUTPATIENT)
Facility: HOSPITAL | Age: 65
End: 2025-04-08
Payer: COMMERCIAL

## 2025-04-08 VITALS
TEMPERATURE: 98.1 F | WEIGHT: 152 LBS | RESPIRATION RATE: 16 BRPM | BODY MASS INDEX: 28.7 KG/M2 | DIASTOLIC BLOOD PRESSURE: 83 MMHG | HEART RATE: 72 BPM | HEIGHT: 61 IN | SYSTOLIC BLOOD PRESSURE: 129 MMHG | OXYGEN SATURATION: 100 %

## 2025-04-08 DIAGNOSIS — K57.32 SIGMOID DIVERTICULITIS: Primary | ICD-10-CM

## 2025-04-08 DIAGNOSIS — K56.7 ILEUS (HCC): ICD-10-CM

## 2025-04-08 LAB
ALBUMIN SERPL-MCNC: 3.7 G/DL (ref 3.5–5)
ALBUMIN/GLOB SERPL: 1 (ref 1.1–2.2)
ALP SERPL-CCNC: 69 U/L (ref 45–117)
ALT SERPL-CCNC: 23 U/L (ref 12–78)
ANION GAP SERPL CALC-SCNC: 12 MMOL/L (ref 2–12)
APPEARANCE UR: CLEAR
AST SERPL W P-5'-P-CCNC: 14 U/L (ref 15–37)
BACTERIA URNS QL MICRO: NEGATIVE /HPF
BASOPHILS # BLD: 0.03 K/UL (ref 0–0.1)
BASOPHILS NFR BLD: 0.2 % (ref 0–1)
BILIRUB SERPL-MCNC: 0.5 MG/DL (ref 0.2–1)
BILIRUB UR QL: NEGATIVE
BUN SERPL-MCNC: 12 MG/DL (ref 6–20)
BUN/CREAT SERPL: 15 (ref 12–20)
CA-I BLD-MCNC: 9.1 MG/DL (ref 8.5–10.1)
CHLORIDE SERPL-SCNC: 101 MMOL/L (ref 97–108)
CO2 SERPL-SCNC: 27 MMOL/L (ref 21–32)
COLOR UR: YELLOW
CREAT SERPL-MCNC: 0.82 MG/DL (ref 0.55–1.02)
DIFFERENTIAL METHOD BLD: ABNORMAL
EKG ATRIAL RATE: 79 BPM
EKG DIAGNOSIS: NORMAL
EKG P AXIS: 42 DEGREES
EKG P-R INTERVAL: 158 MS
EKG Q-T INTERVAL: 368 MS
EKG QRS DURATION: 80 MS
EKG QTC CALCULATION (BAZETT): 421 MS
EKG R AXIS: 29 DEGREES
EKG T AXIS: 40 DEGREES
EKG VENTRICULAR RATE: 79 BPM
EOSINOPHIL # BLD: 0.12 K/UL (ref 0–0.4)
EOSINOPHIL NFR BLD: 0.9 % (ref 0–7)
EPITH CASTS URNS QL MICRO: ABNORMAL /LPF
ERYTHROCYTE [DISTWIDTH] IN BLOOD BY AUTOMATED COUNT: 12 % (ref 11.5–14.5)
GLOBULIN SER CALC-MCNC: 3.7 G/DL (ref 2–4)
GLUCOSE SERPL-MCNC: 112 MG/DL (ref 65–100)
GLUCOSE UR STRIP.AUTO-MCNC: NEGATIVE MG/DL
HCT VFR BLD AUTO: 38.7 % (ref 35–47)
HGB BLD-MCNC: 12.5 G/DL (ref 11.5–16)
HGB UR QL STRIP: NEGATIVE
IMM GRANULOCYTES # BLD AUTO: 0.04 K/UL (ref 0–0.04)
IMM GRANULOCYTES NFR BLD AUTO: 0.3 % (ref 0–0.5)
KETONES UR QL STRIP.AUTO: NEGATIVE MG/DL
LEUKOCYTE ESTERASE UR QL STRIP.AUTO: NEGATIVE
LIPASE SERPL-CCNC: 34 U/L (ref 13–75)
LYMPHOCYTES # BLD: 1.95 K/UL (ref 0.8–3.5)
LYMPHOCYTES NFR BLD: 14.8 % (ref 12–49)
MCH RBC QN AUTO: 31.4 PG (ref 26–34)
MCHC RBC AUTO-ENTMCNC: 32.3 G/DL (ref 30–36.5)
MCV RBC AUTO: 97.2 FL (ref 80–99)
MONOCYTES # BLD: 1.43 K/UL (ref 0–1)
MONOCYTES NFR BLD: 10.9 % (ref 5–13)
NEUTS SEG # BLD: 9.6 K/UL (ref 1.8–8)
NEUTS SEG NFR BLD: 72.9 % (ref 32–75)
NITRITE UR QL STRIP.AUTO: NEGATIVE
PH UR STRIP: 6 (ref 5–8)
PLATELET # BLD AUTO: 306 K/UL (ref 150–400)
PMV BLD AUTO: 9.6 FL (ref 8.9–12.9)
POTASSIUM SERPL-SCNC: 3.6 MMOL/L (ref 3.5–5.1)
PROT SERPL-MCNC: 7.4 G/DL (ref 6.4–8.2)
PROT UR STRIP-MCNC: NEGATIVE MG/DL
RBC # BLD AUTO: 3.98 M/UL (ref 3.8–5.2)
RBC #/AREA URNS HPF: ABNORMAL /HPF (ref 0–5)
SODIUM SERPL-SCNC: 140 MMOL/L (ref 136–145)
SP GR UR REFRACTOMETRY: 1.01 (ref 1–1.03)
TROPONIN I SERPL HS-MCNC: <4 NG/L (ref 0–51)
URINE CULTURE IF INDICATED: ABNORMAL
UROBILINOGEN UR QL STRIP.AUTO: 0.1 EU/DL (ref 0.2–1)
WBC # BLD AUTO: 13.2 K/UL (ref 3.6–11)
WBC URNS QL MICRO: ABNORMAL /HPF (ref 0–4)

## 2025-04-08 PROCEDURE — 80053 COMPREHEN METABOLIC PANEL: CPT

## 2025-04-08 PROCEDURE — 36415 COLL VENOUS BLD VENIPUNCTURE: CPT

## 2025-04-08 PROCEDURE — 74177 CT ABD & PELVIS W/CONTRAST: CPT

## 2025-04-08 PROCEDURE — 99285 EMERGENCY DEPT VISIT HI MDM: CPT

## 2025-04-08 PROCEDURE — 93010 ELECTROCARDIOGRAM REPORT: CPT | Performed by: SPECIALIST

## 2025-04-08 PROCEDURE — 85025 COMPLETE CBC W/AUTO DIFF WBC: CPT

## 2025-04-08 PROCEDURE — 93005 ELECTROCARDIOGRAM TRACING: CPT | Performed by: PHYSICIAN ASSISTANT

## 2025-04-08 PROCEDURE — 96374 THER/PROPH/DIAG INJ IV PUSH: CPT

## 2025-04-08 PROCEDURE — 83690 ASSAY OF LIPASE: CPT

## 2025-04-08 PROCEDURE — 84484 ASSAY OF TROPONIN QUANT: CPT

## 2025-04-08 PROCEDURE — 6360000002 HC RX W HCPCS: Performed by: PHYSICIAN ASSISTANT

## 2025-04-08 PROCEDURE — 81001 URINALYSIS AUTO W/SCOPE: CPT

## 2025-04-08 PROCEDURE — 6360000004 HC RX CONTRAST MEDICATION: Performed by: PHYSICIAN ASSISTANT

## 2025-04-08 RX ORDER — METRONIDAZOLE 500 MG/1
500 TABLET ORAL 2 TIMES DAILY
Qty: 20 TABLET | Refills: 0 | Status: SHIPPED | OUTPATIENT
Start: 2025-04-08 | End: 2025-04-18

## 2025-04-08 RX ORDER — KETOROLAC TROMETHAMINE 15 MG/ML
15 INJECTION, SOLUTION INTRAMUSCULAR; INTRAVENOUS ONCE
Status: COMPLETED | OUTPATIENT
Start: 2025-04-08 | End: 2025-04-08

## 2025-04-08 RX ORDER — IOPAMIDOL 755 MG/ML
100 INJECTION, SOLUTION INTRAVASCULAR
Status: COMPLETED | OUTPATIENT
Start: 2025-04-08 | End: 2025-04-08

## 2025-04-08 RX ORDER — CIPROFLOXACIN 500 MG/1
500 TABLET, FILM COATED ORAL 2 TIMES DAILY
Qty: 20 TABLET | Refills: 0 | Status: SHIPPED | OUTPATIENT
Start: 2025-04-08 | End: 2025-04-18

## 2025-04-08 RX ADMIN — KETOROLAC TROMETHAMINE 15 MG: 15 INJECTION, SOLUTION INTRAMUSCULAR; INTRAVENOUS at 15:53

## 2025-04-08 RX ADMIN — IOPAMIDOL 100 ML: 755 INJECTION, SOLUTION INTRAVENOUS at 15:48

## 2025-04-08 ASSESSMENT — PAIN - FUNCTIONAL ASSESSMENT: PAIN_FUNCTIONAL_ASSESSMENT: 0-10

## 2025-04-08 ASSESSMENT — PAIN DESCRIPTION - DESCRIPTORS: DESCRIPTORS: PRESSURE;STABBING

## 2025-04-08 ASSESSMENT — PAIN DESCRIPTION - LOCATION: LOCATION: ABDOMEN

## 2025-04-08 ASSESSMENT — PAIN DESCRIPTION - ORIENTATION: ORIENTATION: LEFT;LOWER

## 2025-04-08 ASSESSMENT — PAIN SCALES - GENERAL: PAINLEVEL_OUTOF10: 8

## 2025-04-08 ASSESSMENT — LIFESTYLE VARIABLES
HOW MANY STANDARD DRINKS CONTAINING ALCOHOL DO YOU HAVE ON A TYPICAL DAY: 1 OR 2
HOW OFTEN DO YOU HAVE A DRINK CONTAINING ALCOHOL: 2-3 TIMES A WEEK

## 2025-04-08 NOTE — ED PROVIDER NOTES
Where to Get Your Medications        These medications were sent to [facility]/pharm[facility] #[id number] - [place], [place] [facility] [address] [facility] [address] [phone] - F [phone]  [address] [facility] [address]      Phone: [phone]   ciprofloxacin 500 MG tablet  metroNIDAZOLE 500 MG tablet           DISCONTINUED MEDICATIONS:  Current Discharge Medication List        STOP taking these medications       lidocaine viscous hcl (XYLOCAINE) 2 % SOLN solution Comments:   Reason for Stopping:               I am the Primary Clinician of Record. Tonie Davidson PA-C (electronically signed)    (Please note that parts of this dictation were completed with voice recognition software. Quite often unanticipated grammatical, syntax, homophones, and other interpretive errors are inadvertently transcribed by the computer software. Please disregards these errors. Please excuse any errors that have escaped final proofreading.)     Tonie Davidson PA-C  04/08/25 2009

## 2025-04-08 NOTE — DISCHARGE INSTRUCTIONS
Take your antibiotics as prescribed. Take tylenol or ibuprofen as needed for pain. Adequately hydrate. To prevent diverticulitis, increase fiber intake. Follow up with your PCP in 3-5 days. Seek emergent medical attention if symptoms worsen/persist.

## 2025-04-08 NOTE — ED TRIAGE NOTES
Pt ambulatory to triage reporting 8/10 LLQ pain x5 days. Endorses nausea, denies vomiting or diarrhea. Hx diverticulitis Oct 2023.